# Patient Record
Sex: MALE | Race: WHITE | NOT HISPANIC OR LATINO | Employment: FULL TIME | URBAN - METROPOLITAN AREA
[De-identification: names, ages, dates, MRNs, and addresses within clinical notes are randomized per-mention and may not be internally consistent; named-entity substitution may affect disease eponyms.]

---

## 2017-09-23 ENCOUNTER — GENERIC CONVERSION - ENCOUNTER (OUTPATIENT)
Dept: OTHER | Facility: OTHER | Age: 42
End: 2017-09-23

## 2018-01-22 VITALS
DIASTOLIC BLOOD PRESSURE: 84 MMHG | HEART RATE: 92 BPM | SYSTOLIC BLOOD PRESSURE: 136 MMHG | RESPIRATION RATE: 20 BRPM | TEMPERATURE: 97.3 F | OXYGEN SATURATION: 97 %

## 2018-04-21 ENCOUNTER — OFFICE VISIT (OUTPATIENT)
Dept: URGENT CARE | Facility: CLINIC | Age: 43
End: 2018-04-21
Payer: COMMERCIAL

## 2018-04-21 VITALS
WEIGHT: 315 LBS | OXYGEN SATURATION: 100 % | HEIGHT: 78 IN | HEART RATE: 84 BPM | SYSTOLIC BLOOD PRESSURE: 138 MMHG | RESPIRATION RATE: 18 BRPM | TEMPERATURE: 97.8 F | BODY MASS INDEX: 36.45 KG/M2 | DIASTOLIC BLOOD PRESSURE: 94 MMHG

## 2018-04-21 DIAGNOSIS — J02.9 SORE THROAT: Primary | ICD-10-CM

## 2018-04-21 DIAGNOSIS — J02.0 ACUTE STREPTOCOCCAL PHARYNGITIS: ICD-10-CM

## 2018-04-21 LAB — S PYO AG THROAT QL: POSITIVE

## 2018-04-21 PROCEDURE — 87880 STREP A ASSAY W/OPTIC: CPT | Performed by: PHYSICIAN ASSISTANT

## 2018-04-21 PROCEDURE — 99213 OFFICE O/P EST LOW 20 MIN: CPT | Performed by: PHYSICIAN ASSISTANT

## 2018-04-21 RX ORDER — AMOXICILLIN 500 MG/1
1000 CAPSULE ORAL EVERY 12 HOURS SCHEDULED
Qty: 40 CAPSULE | Refills: 0 | Status: SHIPPED | OUTPATIENT
Start: 2018-04-21 | End: 2018-05-01

## 2018-04-21 NOTE — PROGRESS NOTES
Assessment/Plan:         Diagnoses and all orders for this visit:    Sore throat  -     POCT rapid strepA    Acute streptococcal pharyngitis  -     amoxicillin (AMOXIL) 500 mg capsule; Take 2 capsules (1,000 mg total) by mouth every 12 (twelve) hours for 10 days Take with food  Discussed condition with pt  Rapid Strep A screen is positive in the office today  I will treat him with a  10 day course of oral Amoxicillin and rec hydration, rest, discussed pain control, soft diet, and observation  Needs to watch with close contact as he has two young children at home  He should be reevaluated within 5-7 days if not significantly improved  Chief Complaint   Patient presents with    Cold Like Symptoms     Pt here ill x4 days pt states sore throat to start ,  now right ear pain,  no fever  No meds used  Subjective:      Patient ID: Zara Dickinson is a 40 y o  male  Pt presents with a few day history of ST , difficulty swallowing, right ear pain when swallowing or yawning  Denies nasal congestion, cough, fever, chills, body aches, N/V/D  He has taken nothing for symptoms  He is not prone to strep throat  Denies hx of asthma/allergies  Smokes 1 ppd  He did receive the flu vaccine  The following portions of the patient's history were reviewed and updated as appropriate: He  has no past medical history on file  He There are no active problems to display for this patient  He  has a past surgical history that includes Fracture surgery  His family history includes No Known Problems in his father and mother  He  reports that he has been smoking  He has a 20 00 pack-year smoking history  He has never used smokeless tobacco  He reports that he drinks alcohol  He reports that he does not use drugs  Current Outpatient Prescriptions   Medication Sig Dispense Refill    amoxicillin (AMOXIL) 500 mg capsule Take 2 capsules (1,000 mg total) by mouth every 12 (twelve) hours for 10 days Take with food   36 capsule 0     No current facility-administered medications for this visit  No current outpatient prescriptions on file prior to visit  No current facility-administered medications on file prior to visit  He has No Known Allergies       Review of Systems   Constitutional: Negative  HENT: Positive for sore throat and trouble swallowing  Negative for congestion, postnasal drip and rhinorrhea  Respiratory: Negative  Cardiovascular: Negative  Gastrointestinal: Negative  Genitourinary: Negative  Musculoskeletal: Negative for myalgias  Objective:      /94 (BP Location: Right arm, Patient Position: Sitting, Cuff Size: Large)   Pulse 84   Temp 97 8 °F (36 6 °C) (Tympanic)   Resp 18   Ht 6' 6" (1 981 m)   Wt (!) 145 kg (319 lb 3 2 oz)   SpO2 100%   BMI 36 89 kg/m²          Physical Exam   Constitutional: He is oriented to person, place, and time  He appears well-developed and well-nourished  No distress  HENT:   Right Ear: Hearing, tympanic membrane, external ear and ear canal normal    Left Ear: Hearing, tympanic membrane, external ear and ear canal normal    Nose: Nose normal    Mouth/Throat: Mucous membranes are normal  Posterior oropharyngeal erythema present  No oropharyngeal exudate or posterior oropharyngeal edema (No significant tonsillar hypertrophy)  Neck: Neck supple  Cardiovascular: Normal rate, regular rhythm and normal heart sounds  Pulmonary/Chest: Effort normal    Lymphadenopathy:     He has no cervical adenopathy  Neurological: He is alert and oriented to person, place, and time  Psychiatric: He has a normal mood and affect  Vitals reviewed

## 2018-09-25 ENCOUNTER — OFFICE VISIT (OUTPATIENT)
Dept: URGENT CARE | Facility: CLINIC | Age: 43
End: 2018-09-25
Payer: COMMERCIAL

## 2018-09-25 VITALS
HEART RATE: 73 BPM | HEIGHT: 78 IN | TEMPERATURE: 97.9 F | BODY MASS INDEX: 34.94 KG/M2 | SYSTOLIC BLOOD PRESSURE: 110 MMHG | RESPIRATION RATE: 16 BRPM | WEIGHT: 302 LBS | OXYGEN SATURATION: 100 % | DIASTOLIC BLOOD PRESSURE: 70 MMHG

## 2018-09-25 DIAGNOSIS — J06.9 VIRAL URI: ICD-10-CM

## 2018-09-25 DIAGNOSIS — J20.9 ACUTE BRONCHITIS, UNSPECIFIED ORGANISM: Primary | ICD-10-CM

## 2018-09-25 PROCEDURE — 99213 OFFICE O/P EST LOW 20 MIN: CPT | Performed by: PHYSICIAN ASSISTANT

## 2018-09-25 RX ORDER — PREDNISONE 10 MG/1
TABLET ORAL
Qty: 21 TABLET | Refills: 0 | Status: SHIPPED | OUTPATIENT
Start: 2018-09-25 | End: 2019-10-27

## 2018-09-25 NOTE — PROGRESS NOTES
[facility]    NAME: Charito Knight is a 40 y o  male  : 1975    MRN: [id number]  DATE: 2018  TIME: 1:36 PM    Assessment and Plan   Acute bronchitis, unspecified organism [J20 9]  1  Acute bronchitis, unspecified organism  predniSONE 10 mg tablet   2  Viral URI       Patient Instructions   Begin prednisone  Take with food and water  While on this medication do not take any NSAIDs including ibuprofen (Advil), naproxen (Aleve), etc    Continue a decongestant  Rest and push fluids  Follow up with your PCP in 3-5 days  Proceed to the ER if symptoms worsen  Chief Complaint     Chief Complaint   Patient presents with    Cold Like Symptoms     Pt states cold that feels like in chest for 2 days  NyQuil taken at home  History of Present Illness   39 y/o male presenting with c/o nasal congestion x 2-3 days  He notes that over the past 5 days he has had an intermittently productive cough that is worse at night and lying down  He notes nasal congestion is worse at night and in the morning but does decreased significantly throughout the day  Symptoms associated with bilateral ear pressure and sensation of chest congestion  He has been treating with NyQuil with some relief of congestion  No shortness of breath or wheezing  No F/C, or GI symptoms  He notes that he often gets bronchitis and is concerned for this as he will be traveling tomorrow to [address] [place] [address] with his family  No sick contacts or recent travel  Review of Systems   Review of Systems   Constitutional: Negative for chills, diaphoresis, fatigue and fever  HENT: Negative for sore throat  Respiratory: Negative for shortness of breath and wheezing  Cardiovascular: Negative for chest pain and palpitations  Gastrointestinal: Negative for abdominal pain, diarrhea, nausea and vomiting  Musculoskeletal: Negative for arthralgias and myalgias  Skin: Negative for rash       Current Medications       Current Outpatient Prescriptions:     predniSONE 10 mg tablet, Take 6 tablets day 1, 5 tablets day 2, 4 tablets day 3, 3 tablets day 4, 2 tablets day 5, 1 tablet day 6 , Disp: 21 tablet, Rfl: 0    Current Allergies     Allergies as of 09/25/2018    (No Known Allergies)            The following portions of the patient's history were reviewed and updated as appropriate: allergies, current medications, past family history, past medical history, past social history, past surgical history and problem list      History reviewed  No pertinent past medical history  Past Surgical History:   Procedure Laterality Date    FRACTURE SURGERY      left arm compound  Fracture  Family History   Problem Relation Age of Onset    No Known Problems Mother     No Known Problems Father      Medications have been verified  Objective   /70   Pulse 73   Temp 97 9 °F (36 6 °C)   Resp 16   Ht 6' 6" (1 981 m)   Wt (!) 137 kg (302 lb)   SpO2 100%   BMI 34 90 kg/m²      Physical Exam     Physical Exam   Constitutional: He is oriented to person, place, and time  He appears well-developed and well-nourished  No distress  HENT:   Head: Normocephalic and atraumatic  Right Ear: Hearing, tympanic membrane, external ear and ear canal normal    Left Ear: Hearing, tympanic membrane, external ear and ear canal normal    Nose: Mucosal edema (mild) present  Mouth/Throat: Uvula is midline, oropharynx is clear and moist and mucous membranes are normal  Mucous membranes are not pale, not dry and not cyanotic  No oropharyngeal exudate, posterior oropharyngeal edema or posterior oropharyngeal erythema  Eyes: Conjunctivae are normal  Right eye exhibits no discharge  Left eye exhibits no discharge  No scleral icterus  Neck: Neck supple  Cardiovascular: Normal rate, regular rhythm and normal heart sounds  Pulmonary/Chest: Effort normal  No respiratory distress  He has no decreased breath sounds   He has wheezes (end inspiratory) in the right middle field, the right lower field and the left middle field  He has no rhonchi  He has no rales  Coarse breath sounds  Abdominal: Soft  Bowel sounds are normal  He exhibits no distension  There is no tenderness  Lymphadenopathy:     He has no cervical adenopathy  Neurological: He is alert and oriented to person, place, and time  He has normal reflexes  No cranial nerve deficit  He exhibits normal muscle tone  Coordination normal    Skin: Skin is warm and dry  No rash noted  He is not diaphoretic  Psychiatric: He has a normal mood and affect  His behavior is normal    Nursing note and vitals reviewed

## 2019-10-27 ENCOUNTER — OFFICE VISIT (OUTPATIENT)
Dept: URGENT CARE | Facility: CLINIC | Age: 44
End: 2019-10-27
Payer: COMMERCIAL

## 2019-10-27 VITALS
BODY MASS INDEX: 35.02 KG/M2 | OXYGEN SATURATION: 100 % | DIASTOLIC BLOOD PRESSURE: 90 MMHG | WEIGHT: 303 LBS | RESPIRATION RATE: 16 BRPM | TEMPERATURE: 98.8 F | SYSTOLIC BLOOD PRESSURE: 136 MMHG | HEART RATE: 76 BPM

## 2019-10-27 DIAGNOSIS — J06.9 VIRAL URI WITH COUGH: Primary | ICD-10-CM

## 2019-10-27 PROCEDURE — 99213 OFFICE O/P EST LOW 20 MIN: CPT | Performed by: NURSE PRACTITIONER

## 2019-10-27 RX ORDER — DEXTROMETHORPHAN HYDROBROMIDE AND PROMETHAZINE HYDROCHLORIDE 15; 6.25 MG/5ML; MG/5ML
5 SOLUTION ORAL 4 TIMES DAILY PRN
Qty: 180 ML | Refills: 0 | Status: SHIPPED | OUTPATIENT
Start: 2019-10-27

## 2019-10-27 RX ORDER — ALBUTEROL SULFATE 90 UG/1
2 AEROSOL, METERED RESPIRATORY (INHALATION) EVERY 6 HOURS PRN
Qty: 1 INHALER | Refills: 0 | Status: SHIPPED | OUTPATIENT
Start: 2019-10-27

## 2019-10-27 NOTE — PATIENT INSTRUCTIONS
Fluids  Rest  Nasal saline  Supportive care for symptom management  Tylenol or ibuprofen as needed for fever or discomfort  Use a cool mist humidifier at bedtime  Antibiotics are not indicated at this time  Rescue inhaler 2 puffs every 4-6 hours as needed for shortness breath, chest tightness, bronchospasm, coughing fits  Promethazine DM as needed for cough  Symptoms may persist for 7-14 days     Recommend that you establish with PCP for ongoing healthcare needs

## 2019-10-27 NOTE — PROGRESS NOTES
[facility]        NAME: Tammy Chaudhary is a 44 y o  male  : 1975    MRN: [id number]  DATE: 2019  TIME: 10:09 AM    Assessment and Plan   1  Viral URI with cough  Symptomatic tx  Antibiotics are not indicated  - Promethazine-DM (PHENERGAN-DM) 6 25-15 mg/5 mL oral syrup; Take 5 mL by mouth 4 (four) times a day as needed for cough  Dispense: 180 mL; Refill: 0  - albuterol (VENTOLIN HFA) 90 mcg/act inhaler; Inhale 2 puffs every 6 (six) hours as needed for wheezing or shortness of breath  Dispense: 1 Inhaler; Refill: 0    Long d/w pt regarding bronchitis as viral illness and does not usually require abx although at this time, he appears with viral uri   Discussed need for pcp for ongoing health care needs  Also reviewed the role in cigarette smoking in recurrent pulmonary symptoms -   Pt verbalized understanding of all of the above  Patient Instructions   Fluids  Rest  Nasal saline  Supportive care for symptom management  Tylenol or ibuprofen as needed for fever or discomfort  Use a cool mist humidifier at bedtime  Antibiotics are not indicated at this time  Rescue inhaler 2 puffs every 4-6 hours as needed for shortness breath, chest tightness, bronchospasm, coughing fits  Promethazine DM as needed for cough  Symptoms may persist for 7-14 days  Recommend that you establish with PCP for ongoing healthcare needs           Chief Complaint     Chief Complaint   Patient presents with    Cold Like Symptoms     X 4 days, productive cough, denies fever  History of Present Illness       Symptoms for 4 days  Cold symptoms   Stuffy nose  No fever  Cough, productive in am and late in night  No sore throat  Has not taken otc meds  Thought chest was tight and had slight wheeze yesterday  A little better today  Does not have a pcp  States "doesn't have time for that"  Concerned regarding bronchitis   States gets bronchitis frequently     (+) smoker      Review of Systems   Review of Systems   Constitutional: Positive for fatigue  Negative for fever  HENT: Positive for congestion, postnasal drip and rhinorrhea  Negative for sinus pressure, sinus pain and sore throat  Respiratory: Positive for cough, chest tightness and wheezing  Negative for shortness of breath  Gastrointestinal: Negative for diarrhea, nausea and vomiting  Musculoskeletal: Negative for myalgias  Neurological: Negative for dizziness and headaches  Hematological: Negative for adenopathy  Current Medications       Current Outpatient Medications:     albuterol (VENTOLIN HFA) 90 mcg/act inhaler, Inhale 2 puffs every 6 (six) hours as needed for wheezing or shortness of breath, Disp: 1 Inhaler, Rfl: 0    Promethazine-DM (PHENERGAN-DM) 6 25-15 mg/5 mL oral syrup, Take 5 mL by mouth 4 (four) times a day as needed for cough, Disp: 180 mL, Rfl: 0    Current Allergies     Allergies as of 10/27/2019    (No Known Allergies)            The following portions of the patient's history were reviewed and updated as appropriate: allergies, current medications, past family history, past medical history, past social history, past surgical history and problem list      History reviewed  No pertinent past medical history  Past Surgical History:   Procedure Laterality Date    FRACTURE SURGERY      left arm compound  Fracture  Family History   Problem Relation Age of Onset    No Known Problems Mother     No Known Problems Father          Medications have been verified  Objective   /90   Pulse 76   Temp 98 8 °F (37 1 °C) (Tympanic)   Resp 16   Wt (!) 137 kg (303 lb)   SpO2 100%   BMI 35 02 kg/m²        Physical Exam     Physical Exam   Constitutional: He appears well-developed and well-nourished  No distress  HENT:   TMS WNL  Turbinates inflamed  Oropharynx with no erythema or exudate     No sinus tenderness to palpation    (+) PND     Pulmonary/Chest: Effort normal and breath sounds normal  No respiratory distress  He has no wheezes  Lymphadenopathy:     He has no cervical adenopathy  Neurological: He is alert  Skin: Skin is warm and dry  Vitals reviewed

## 2021-02-19 ENCOUNTER — IMMUNIZATIONS (OUTPATIENT)
Dept: FAMILY MEDICINE CLINIC | Facility: HOSPITAL | Age: 46
End: 2021-02-19

## 2021-02-19 DIAGNOSIS — Z23 ENCOUNTER FOR IMMUNIZATION: Primary | ICD-10-CM

## 2021-03-18 ENCOUNTER — IMMUNIZATIONS (OUTPATIENT)
Dept: FAMILY MEDICINE CLINIC | Facility: HOSPITAL | Age: 46
End: 2021-03-18

## 2021-03-18 DIAGNOSIS — Z23 ENCOUNTER FOR IMMUNIZATION: Primary | ICD-10-CM

## 2021-03-18 PROCEDURE — 91301 SARS-COV-2 / COVID-19 MRNA VACCINE (MODERNA) 100 MCG: CPT

## 2021-03-18 PROCEDURE — 0012A SARS-COV-2 / COVID-19 MRNA VACCINE (MODERNA) 100 MCG: CPT
